# Patient Record
Sex: FEMALE | Race: WHITE | NOT HISPANIC OR LATINO | Employment: FULL TIME | URBAN - NONMETROPOLITAN AREA
[De-identification: names, ages, dates, MRNs, and addresses within clinical notes are randomized per-mention and may not be internally consistent; named-entity substitution may affect disease eponyms.]

---

## 2023-10-21 ENCOUNTER — HOSPITAL ENCOUNTER (EMERGENCY)
Facility: HOSPITAL | Age: 38
Discharge: HOME | End: 2023-10-21
Attending: EMERGENCY MEDICINE
Payer: COMMERCIAL

## 2023-10-21 VITALS
RESPIRATION RATE: 20 BRPM | HEIGHT: 62 IN | TEMPERATURE: 97.8 F | WEIGHT: 109.79 LBS | SYSTOLIC BLOOD PRESSURE: 124 MMHG | BODY MASS INDEX: 20.2 KG/M2 | OXYGEN SATURATION: 98 % | HEART RATE: 101 BPM | DIASTOLIC BLOOD PRESSURE: 71 MMHG

## 2023-10-21 DIAGNOSIS — K04.7 DENTAL ABSCESS: Primary | ICD-10-CM

## 2023-10-21 PROCEDURE — 99284 EMERGENCY DEPT VISIT MOD MDM: CPT | Performed by: EMERGENCY MEDICINE

## 2023-10-21 PROCEDURE — 99282 EMERGENCY DEPT VISIT SF MDM: CPT

## 2023-10-21 RX ORDER — IBUPROFEN 400 MG/1
400 TABLET ORAL ONCE
Status: COMPLETED | OUTPATIENT
Start: 2023-10-21 | End: 2023-10-21

## 2023-10-21 RX ORDER — CLINDAMYCIN HYDROCHLORIDE 300 MG/1
300 CAPSULE ORAL EVERY 6 HOURS
Qty: 28 CAPSULE | Refills: 0 | Status: SHIPPED | OUTPATIENT
Start: 2023-10-21 | End: 2023-10-28

## 2023-10-21 RX ORDER — LEVOTHYROXINE SODIUM 0.07 MG/1
75 TABLET ORAL DAILY
COMMUNITY

## 2023-10-21 RX ORDER — CLINDAMYCIN HYDROCHLORIDE 150 MG/1
450 CAPSULE ORAL ONCE
Status: COMPLETED | OUTPATIENT
Start: 2023-10-21 | End: 2023-10-21

## 2023-10-21 RX ADMIN — CLINDAMYCIN HYDROCHLORIDE 450 MG: 150 CAPSULE ORAL at 08:39

## 2023-10-21 RX ADMIN — IBUPROFEN 400 MG: 400 TABLET, FILM COATED ORAL at 08:39

## 2023-10-21 NOTE — ED PROVIDER NOTES
History  Chief Complaint   Patient presents with    Dental Pain     Left upper dental  pain and facial swelling, started 2 days ago, getting worse, pt. Has poor dental hygiene , pt. Is not established with a dentist      77-year-old female describes worsening left upper dental and facial pain, swelling over the past 2 days, improved with ice last night. Notes multiple prior dental abscesses improved with oral antibiotics. No recent dental care, history of multiple extractions. She denies headache and vision changes. No fever or shaking chills. Past medical history includes active polysubstance use including intravenous use. History provided by:  Patient  Dental Pain  Location:  Upper  Quality:  Aching, dull and constant  Severity:  Mild  Onset quality:  Gradual  Duration:  2 days  Timing:  Constant  Progression:  Worsening  Chronicity:  New  Context: abscess    Relieved by:  None tried  Associated symptoms: no fever, no neck pain and no trismus    Risk factors: lack of dental care        Prior to Admission Medications   Prescriptions Last Dose Informant Patient Reported? Taking?   levothyroxine 75 mcg tablet   Yes Yes   Sig: Take 75 mcg by mouth daily      Facility-Administered Medications: None       Past Medical History:   Diagnosis Date    Disease of thyroid gland     Fatty liver     Hepatitis C        Past Surgical History:   Procedure Laterality Date    LASIK         History reviewed. No pertinent family history. I have reviewed and agree with the history as documented.     E-Cigarette/Vaping    E-Cigarette Use Never User      E-Cigarette/Vaping Substances    Nicotine Yes     THC No     CBD No      Social History     Tobacco Use    Smoking status: Every Day     Packs/day: 0.50     Types: Cigarettes     Passive exposure: Never    Smokeless tobacco: Never   Vaping Use    Vaping Use: Never used   Substance Use Topics    Alcohol use: Not Currently    Drug use: Yes     Types: Methamphetamines, Fentanyl Comment: last used 10/21/23 at 4 am       Review of Systems   Constitutional:  Negative for fever. Musculoskeletal:  Negative for neck pain. All other systems reviewed and are negative. Physical Exam  Physical Exam  Vitals and nursing note reviewed. Constitutional:       Comments: Pleasant, comfortable-appearing   HENT:      Head: Normocephalic and atraumatic. Mouth/Throat:      Mouth: Mucous membranes are moist.      Pharynx: Oropharynx is clear. Comments: Opens mouth well, wide, multiple upper more midline dental decay, teeth generally absent except some visible at gumline, no left upper gum swelling or drainage, no oropharyngeal swelling or asymmetry, no sublingual or submandibular swelling or asymmetry, left maxillary area swollen, minimally tender, extraocular muscles intact, vision grossly intact bilaterally  Eyes:      Conjunctiva/sclera: Conjunctivae normal.      Pupils: Pupils are equal, round, and reactive to light. Cardiovascular:      Rate and Rhythm: Normal rate and regular rhythm. Heart sounds: Normal heart sounds. Pulmonary:      Effort: Pulmonary effort is normal.      Breath sounds: Normal breath sounds. Abdominal:      General: Bowel sounds are normal. There is no distension. Palpations: Abdomen is soft. Tenderness: There is no abdominal tenderness. Musculoskeletal:         General: No deformity. Cervical back: Neck supple. Skin:     General: Skin is warm and dry. Neurological:      General: No focal deficit present. Mental Status: She is alert and oriented to person, place, and time. Cranial Nerves: No cranial nerve deficit. Coordination: Coordination normal.   Psychiatric:         Behavior: Behavior normal.         Thought Content:  Thought content normal.         Judgment: Judgment normal.         Vital Signs  ED Triage Vitals [10/21/23 0810]   Temperature Pulse Respirations Blood Pressure SpO2   97.8 °F (36.6 °C) 101 20 124/71 98 %      Temp Source Heart Rate Source Patient Position - Orthostatic VS BP Location FiO2 (%)   Temporal Monitor Sitting Right arm --      Pain Score       6           Vitals:    10/21/23 0810   BP: 124/71   Pulse: 101   Patient Position - Orthostatic VS: Sitting         Visual Acuity      ED Medications  Medications   clindamycin (CLEOCIN) capsule 450 mg (450 mg Oral Given 10/21/23 0839)   ibuprofen (MOTRIN) tablet 400 mg (400 mg Oral Given 10/21/23 5085)       Diagnostic Studies  Results Reviewed       None                   No orders to display              Procedures  Procedures         ED Course  ED Course as of 10/21/23 0843   Sat Oct 21, 2023   0834 We discussed options including further emergency department evaluation, possible hospitalization and currently requests antibiotic treatment, ibuprofen would like to return if worse or any new symptoms and agrees to return if reconsiders further evaluation                               SBIRT 22yo+      Flowsheet Row Most Recent Value   Initial Alcohol Screen: US AUDIT-C     1. How often do you have a drink containing alcohol? 0 Filed at: 10/21/2023 0837   2. How many drinks containing alcohol do you have on a typical day you are drinking? 0 Filed at: 10/21/2023 0837   3b. FEMALE Any Age, or MALE 65+: How often do you have 4 or more drinks on one occassion? 0 Filed at: 10/21/2023 0837   Audit-C Score 0 Filed at: 10/21/2023 6038   JEROD: How many times in the past year have you. .. Used an illegal drug or used a prescription medication for non-medical reasons? Daily or Almost Daily Filed at: 10/21/2023 1770   DAST-10: In the past 12 months. ..    1. Have you used drugs other than those required for medical reasons? 1 Filed at: 10/21/2023 0837   2. Do you use more than one drug at a time? 1 Filed at: 10/21/2023 0837   3. Have you had medical problems as a result of your drug use (e.g., memory loss, hepatitis, convulsions, bleeding, etc.)?  1 Filed at: 10/21/2023 6292   4. Have you had "blackouts" or "flashbacks" as a result of drug use? YesNo 1 Filed at: 10/21/2023 0837   5. Do you ever feel bad or guilty about your drug use? 0 Filed at: 10/21/2023 0837   6. Does your spouse (or parent) ever complain about your involvement with drugs? 0 Filed at: 10/21/2023 0837   7. Have you neglected your family because of your use of drugs? 0 Filed at: 10/21/2023 0837   8. Have you engaged in illegal activities in order to obtain drugs? 0 Filed at: 10/21/2023 0837   9. Have you ever experienced withdrawal symptoms (felt sick) when you stopped taking drugs? 0 Filed at: 10/21/2023 0837   10. Are you always able to stop using drugs when you want to? 0 Filed at: 10/21/2023 0837   DAST-10 Score 4 Filed at: 10/21/2023 6406                      Medical Decision Making  Amount and/or Complexity of Data Reviewed  ECG/medicine tests: ordered. Decision-making details documented in ED Course. Risk  Prescription drug management. Disposition  Final diagnoses:   Dental abscess     Time reflects when diagnosis was documented in both MDM as applicable and the Disposition within this note       Time User Action Codes Description Comment    10/21/2023  8:31 AM Adela Fears Add [K04.7] Dental abscess           ED Disposition       ED Disposition   Discharge    Condition   Stable    Date/Time   Sat Oct 21, 2023 1316 [facility] discharge to home/self care. Follow-up Information    None         Discharge Medication List as of 10/21/2023  8:35 AM        START taking these medications    Details   clindamycin (CLEOCIN) 300 MG capsule Take 1 capsule (300 mg total) by mouth every 6 (six) hours for 7 days, Starting Sat 10/21/2023, Until Sat 10/28/2023, Normal           CONTINUE these medications which have NOT CHANGED    Details   levothyroxine 75 mcg tablet Take 75 mcg by mouth daily, Historical Med             No discharge procedures on file.     PDMP Review None            ED Provider  Electronically Signed by             Eliazar Reyna DO  10/21/23 0210

## 2023-10-21 NOTE — DISCHARGE INSTRUCTIONS
Return immediately if worse or any new symptoms  Tylenol 1000 mg every 6 hours as needed  and/or  Advil 400 mg every 6 hours as needed  May take both together  Here is a list of  dental clinics that may be able to help you. Keep in mind that these clinics do not have to see you or any other patient. Also, these clinics are not connected to the [facility] or [facility] but if they agree to see you as a patient it is easy for them to call  Medical Records Department to have your records faxed to them. [facility]:  [address] [place] [address] Venessa y   [id number]:   [facility]   [address] [facility], [address]   [phone]     [facility]:  [address]  [place] [address] [address] [place] [address]  [phone]    [address]:  [address] [facility], [address]  [phone]    [address]:  [address] [place] [address] [address]  ([address] [facility] [address] [address]:  [address] [facility], [address]  [phone]    [facility]:  [address] [facility], [address]  [phone]    [address]:  [address] [place], [place] [address] [facility]  [phone]    [address] [place] [address] Drive:  [place] [id number]  [phone] [address]  [address]  [address], [address]  [phone]    [place] [address]:  [address] [facility] [address], [address] [place] [address]  Associates:  [address] [facility], [address] [place] [address] Drive  [phone]

## 2023-10-21 NOTE — Clinical Note
Sole Mendez was seen and treated in our emergency department on 10/21/2023. Diagnosis:     March Overall  may return to work on return date. She may return on this date: 10/23/2023         If you have any questions or concerns, please don't hesitate to call.       Daria Cirilo, DO    ______________________________           _______________          _______________  Hospital Representative                              Date                                Time

## 2023-12-19 ENCOUNTER — HOSPITAL ENCOUNTER (EMERGENCY)
Facility: HOSPITAL | Age: 38
Discharge: HOME | End: 2023-12-19
Attending: EMERGENCY MEDICINE
Payer: COMMERCIAL

## 2023-12-19 VITALS
HEART RATE: 76 BPM | HEIGHT: 62 IN | RESPIRATION RATE: 16 BRPM | BODY MASS INDEX: 22.12 KG/M2 | SYSTOLIC BLOOD PRESSURE: 116 MMHG | WEIGHT: 120.2 LBS | OXYGEN SATURATION: 100 % | DIASTOLIC BLOOD PRESSURE: 79 MMHG | TEMPERATURE: 97.7 F

## 2023-12-19 DIAGNOSIS — L03.115 CELLULITIS OF RIGHT LOWER EXTREMITY: Primary | ICD-10-CM

## 2023-12-19 PROCEDURE — 99284 EMERGENCY DEPT VISIT MOD MDM: CPT | Performed by: PHYSICIAN ASSISTANT

## 2023-12-19 PROCEDURE — 99282 EMERGENCY DEPT VISIT SF MDM: CPT

## 2023-12-19 RX ORDER — DOXYCYCLINE HYCLATE 100 MG/1
100 CAPSULE ORAL 2 TIMES DAILY
Qty: 20 CAPSULE | Refills: 0 | Status: SHIPPED | OUTPATIENT
Start: 2023-12-19 | End: 2023-12-29

## 2023-12-19 RX ORDER — DOXYCYCLINE HYCLATE 100 MG/1
200 CAPSULE ORAL ONCE
Status: COMPLETED | OUTPATIENT
Start: 2023-12-19 | End: 2023-12-19

## 2023-12-19 RX ADMIN — DOXYCYCLINE 200 MG: 100 CAPSULE ORAL at 10:01

## 2023-12-19 NOTE — Clinical Note
Giana Kauffman was seen and treated in our emergency department on 12/19/2023.                Diagnosis:     Giana  is off the rest of the shift today, may return to work on return date.    She may return on this date: 12/20/2023         If you have any questions or concerns, please don't hesitate to call.      Hadley Oden PA-C    ______________________________           _______________          _______________  Hospital Representative                              Date                                Time

## 2023-12-19 NOTE — ED PROVIDER NOTES
"History  Chief Complaint   Patient presents with    Abscess     C/o \"abscess right calf. Identifies cause to be \"from shooting up\"     Patient is a 38-year-old female who presents with the complaint of erythema to the right lower leg.  The patient states that she is an IV drug abuser currently and has injected in the right lower extremity.  She states that she has had recurrent cellulitis/abscess formation.  She states that what concerned her was today she awoke with some swelling, erythema and warmth of her right lower extremity.  She states that she has a small lump to the right posterior knee which she believes may be an abscess from injecting.  She denies any pain, fevers chills, shortness of breath chest pain.      Medical Problem  Location:  Redness to the right lower extremity and a small lump to the right posterior knee after IV drug injection  Duration:  1 day  Timing:  Constant  Chronicity:  New  Context:  Known IV drug user  Associated symptoms: no chest pain, no fever and no shortness of breath        Prior to Admission Medications   Prescriptions Last Dose Informant Patient Reported? Taking?   levothyroxine 75 mcg tablet   Yes No   Sig: Take 75 mcg by mouth daily      Facility-Administered Medications: None       Past Medical History:   Diagnosis Date    Disease of thyroid gland     Fatty liver     Hepatitis C        Past Surgical History:   Procedure Laterality Date    LASIK         History reviewed. No pertinent family history.  I have reviewed and agree with the history as documented.    E-Cigarette/Vaping    E-Cigarette Use Never User      E-Cigarette/Vaping Substances    Nicotine Yes     THC No     CBD No      Social History     Tobacco Use    Smoking status: Every Day     Current packs/day: 0.50     Types: Cigarettes     Passive exposure: Never    Smokeless tobacco: Never   Vaping Use    Vaping status: Never Used   Substance Use Topics    Alcohol use: Not Currently    Drug use: Yes     Frequency: " 7.0 times per week     Types: Methamphetamines, Fentanyl     Comment: last used today 0430       Review of Systems   Constitutional:  Negative for fever.   Respiratory:  Negative for shortness of breath.    Cardiovascular:  Negative for chest pain.   All other systems reviewed and are negative.      Physical Exam  Physical Exam  Vitals and nursing note reviewed.   Constitutional:       General: She is not in acute distress.     Appearance: She is well-developed.   HENT:      Head: Normocephalic and atraumatic.      Right Ear: External ear normal.      Left Ear: External ear normal.   Eyes:      Extraocular Movements: Extraocular movements intact.      Pupils: Pupils are equal, round, and reactive to light.   Cardiovascular:      Rate and Rhythm: Normal rate and regular rhythm.      Heart sounds: No murmur heard.  Pulmonary:      Effort: Pulmonary effort is normal. No respiratory distress.      Breath sounds: Normal breath sounds. No wheezing.   Abdominal:      General: Bowel sounds are normal.      Palpations: Abdomen is soft.   Musculoskeletal:      Cervical back: Normal range of motion and neck supple.      Comments: Patient has multiple skin excoriations to the lower extremities bilaterally, right lower extremity has light erythema, tracking posterior right lower extremity to the posterior knee.  There is a small 1 cm lump in the right posterior knee, nontender   Skin:     General: Skin is warm and dry.      Capillary Refill: Capillary refill takes less than 2 seconds.   Neurological:      General: No focal deficit present.      Mental Status: She is alert and oriented to person, place, and time.      Motor: No weakness.      Coordination: Coordination normal.      Gait: Gait is intact.   Psychiatric:         Behavior: Behavior normal.         Vital Signs  ED Triage Vitals [12/19/23 0905]   Temperature Pulse Respirations Blood Pressure SpO2   97.7 °F (36.5 °C) 76 16 116/79 100 %      Temp Source Heart Rate Source  Patient Position - Orthostatic VS BP Location FiO2 (%)   Temporal Monitor Sitting Right arm --      Pain Score       No Pain           Vitals:    12/19/23 0905   BP: 116/79   Pulse: 76   Patient Position - Orthostatic VS: Sitting         Visual Acuity      ED Medications  Medications   doxycycline hyclate (VIBRAMYCIN) capsule 200 mg (has no administration in time range)       Diagnostic Studies  Results Reviewed       None                   No orders to display              Procedures  Procedures         ED Course                                             Medical Decision Making  Patient is a 38-year-old female who presents with the complaint of erythema to the right lower leg.  The patient states that she is an IV drug abuser currently and has injected in the right lower extremity.  She states that she has had recurrent cellulitis/abscess formation.  She states that what concerned her was today she awoke with some swelling, erythema and warmth of her right lower extremity.  She states that she has a small lump to the right posterior knee which she believes may be an abscess from injecting.  She denies any pain, fevers chills, shortness of breath chest pain.    Patient has multiple skin excoriations to the lower extremities bilaterally, right lower extremity has light erythema, tracking posterior right lower extremity to the posterior knee.  There is a small 1 cm lump in the right posterior knee, nontender      Discussed with the patient about obtaining lab work and possibly ultrasound due to the mass.  The patient at this time is declining all of the above.  At this time is compliant with oral antibiotics.  Did discuss that the patient may need to return for I&D or for further evaluation and treatment.  She expressed understanding was in agreement with treatment plan.             Disposition  Final diagnoses:   Cellulitis of right lower extremity     Time reflects when diagnosis was documented in both MDM as  applicable and the Disposition within this note       Time User Action Codes Description Comment    12/19/2023  9:33 AM Hadley Oden Add [L03.115] Cellulitis of right lower extremity           ED Disposition       ED Disposition   Discharge    Condition   Stable    Date/Time   Tue Dec 19, 2023  9:33 AM    Comment   Giana Kauffman discharge to home/self care.                   Follow-up Information    None         Patient's Medications   Discharge Prescriptions    DOXYCYCLINE HYCLATE (VIBRAMYCIN) 100 MG CAPSULE    Take 1 capsule (100 mg total) by mouth 2 (two) times a day for 10 days       Start Date: 12/19/2023End Date: 12/29/2023       Order Dose: 100 mg       Quantity: 20 capsule    Refills: 0       No discharge procedures on file.    PDMP Review       None            ED Provider  Electronically Signed by             Hadley Oden PA-C  12/19/23 0937

## 2024-08-26 ENCOUNTER — TELEPHONE (OUTPATIENT)
Dept: DENTISTRY | Facility: CLINIC | Age: 39
End: 2024-08-26

## 2024-08-26 NOTE — TELEPHONE ENCOUNTER
PT called wanting an emergency dental appt because she needs a referral for an OS and has a lot of dental issues.  I returned call and questioned why she missed the appt that had been scheduled for her on 8/19.  PT stated that due to the dental pain that she has she overslept and then woke up thinking it was a different day of the week.  Regardless, we offered her an appt today but she said she couldn't be here in time because she was in [place].  I informed PT that I will keep her on the list to call when we have an opening and advised her to go to Urgent Care.  She said she just needs a referral.    SB

## 2024-09-20 ENCOUNTER — TELEPHONE (OUTPATIENT)
Dept: DENTISTRY | Facility: CLINIC | Age: 39
End: 2024-09-20

## 2024-09-20 NOTE — TELEPHONE ENCOUNTER
PT LVM stating that she wants to get referral for extractions, I returned call and she reported that all but 3 teeth are now above the gumline.  She's in pain and has been taking antibiotic.  I asked her who prescribed that and if it was a script that she had laying around - she said yes and eluded to taking 2 different once because the one wasn't helping.  I instructed her not to do that because when she's prescribed an antibiotic she must take as prescribed until gone.    PT said that she has a recurring abscess.  I advised her to call PCP or to to Urgent Care.  She said she hasn't been to PCP because she stopped taking thyroid meds PCP prescribed.    PT expressed that she needs to be seen asap because she's going to shelter, probably next week.  I explained our Emerg list and emailed provider list.  PT is on Emerg list now.    SB

## 2024-10-04 ENCOUNTER — TELEPHONE (OUTPATIENT)
Dept: DENTISTRY | Facility: CLINIC | Age: 39
End: 2024-10-04

## 2024-10-04 NOTE — TELEPHONE ENCOUNTER
"Called PT at 10:05am to see if she was on her way for her emergency dental appt scheduled at 10:30pm today in case we wanted to call someone else to replace her.  PT sounded like she was sleeping and said \"I don't know why the alarm didn't wake me up, which I heard in the background, but I'll hurry and be on my way.\".  I did tell her that she must be on time.    PT was a NS after all.    SB  "